# Patient Record
Sex: MALE | Race: ASIAN | NOT HISPANIC OR LATINO | URBAN - METROPOLITAN AREA
[De-identification: names, ages, dates, MRNs, and addresses within clinical notes are randomized per-mention and may not be internally consistent; named-entity substitution may affect disease eponyms.]

---

## 2021-06-21 PROBLEM — Z00.00 ENCOUNTER FOR PREVENTIVE HEALTH EXAMINATION: Status: ACTIVE | Noted: 2021-06-21

## 2021-06-23 ENCOUNTER — OUTPATIENT (OUTPATIENT)
Dept: OUTPATIENT SERVICES | Facility: HOSPITAL | Age: 61
LOS: 1 days | End: 2021-06-23

## 2021-06-23 VITALS
TEMPERATURE: 98 F | HEIGHT: 71 IN | OXYGEN SATURATION: 98 % | HEART RATE: 74 BPM | RESPIRATION RATE: 16 BRPM | SYSTOLIC BLOOD PRESSURE: 110 MMHG | WEIGHT: 166.01 LBS | DIASTOLIC BLOOD PRESSURE: 80 MMHG

## 2021-06-23 DIAGNOSIS — D17.1 BENIGN LIPOMATOUS NEOPLASM OF SKIN AND SUBCUTANEOUS TISSUE OF TRUNK: ICD-10-CM

## 2021-06-23 DIAGNOSIS — Z78.9 OTHER SPECIFIED HEALTH STATUS: ICD-10-CM

## 2021-06-23 LAB
HCT VFR BLD CALC: 45.1 % — SIGNIFICANT CHANGE UP (ref 39–50)
HGB BLD-MCNC: 14.8 G/DL — SIGNIFICANT CHANGE UP (ref 13–17)
MCHC RBC-ENTMCNC: 28.6 PG — SIGNIFICANT CHANGE UP (ref 27–34)
MCHC RBC-ENTMCNC: 32.8 GM/DL — SIGNIFICANT CHANGE UP (ref 32–36)
MCV RBC AUTO: 87.2 FL — SIGNIFICANT CHANGE UP (ref 80–100)
NRBC # BLD: 0 /100 WBCS — SIGNIFICANT CHANGE UP
NRBC # FLD: 0 K/UL — SIGNIFICANT CHANGE UP
PLATELET # BLD AUTO: 213 K/UL — SIGNIFICANT CHANGE UP (ref 150–400)
RBC # BLD: 5.17 M/UL — SIGNIFICANT CHANGE UP (ref 4.2–5.8)
RBC # FLD: 13.7 % — SIGNIFICANT CHANGE UP (ref 10.3–14.5)
WBC # BLD: 7.66 K/UL — SIGNIFICANT CHANGE UP (ref 3.8–10.5)
WBC # FLD AUTO: 7.66 K/UL — SIGNIFICANT CHANGE UP (ref 3.8–10.5)

## 2021-06-23 NOTE — H&P PST ADULT - HISTORY OF PRESENT ILLNESS
60 yr old male with preop dx of Benign lipoma left neck presents to have PST eval for Excision lipoma left neck. Patient states he noticed the mass on his left neck about a year and a half ago and was seen by PCP and then referred to Dr Christianson for further evaluation and treatment.

## 2021-06-23 NOTE — H&P PST ADULT - NSICDXPROBLEM_GEN_ALL_CORE_FT
PROBLEM DIAGNOSES  Problem: Benign lipomatous neoplasm of skin and subcutaneous tissue of trunk  Assessment and Plan: Scheduled for Excision lipoma left neck on 6/30/21  Preop instructions provided and patient verbalizes understanding.  Labs done and results pending.   Famotidine provided with instructions. Hibiclens provided with instructions and was signed by patient. Teach-back method was utilized to assess patient's understanding. Patient verbalized understanding.      Problem: Language barrier  Assessment and Plan: Patient speaks Kayla & Madan

## 2021-06-23 NOTE — H&P PST ADULT - NECK DETAILS
Left neck mass, Preop dx Benign lipmatous neoplasm of skin and subcutaneous tissue of trunk/supple Left neck palpable mass, non- tender, soft .Preop dx Benign lipmatous neoplasm of skin and subcutaneous tissue of trunk/supple

## 2021-06-23 NOTE — H&P PST ADULT - HEALTH CARE MAINTENANCE
COVID Vaccine  Pfizer 1st Dose 4/29/21  Pfizer 2nd dose 05/20/21 COVID Vaccine  Pfizer 1st Dose 4/29/21  Pfizer 2nd dose 05/20/21  Copy in chart

## 2021-06-23 NOTE — H&P PST ADULT - NSANTHOSAYNRD_GEN_A_CORE
No. ANDERS screening performed.  STOP BANG Legend: 0-2 = LOW Risk; 3-4 = INTERMEDIATE Risk; 5-8 = HIGH Risk

## 2021-06-25 ENCOUNTER — OUTPATIENT (OUTPATIENT)
Dept: OUTPATIENT SERVICES | Facility: HOSPITAL | Age: 61
LOS: 1 days | End: 2021-06-25
Payer: COMMERCIAL

## 2021-06-25 ENCOUNTER — APPOINTMENT (OUTPATIENT)
Dept: ULTRASOUND IMAGING | Facility: IMAGING CENTER | Age: 61
End: 2021-06-25
Payer: COMMERCIAL

## 2021-06-25 DIAGNOSIS — Z00.8 ENCOUNTER FOR OTHER GENERAL EXAMINATION: ICD-10-CM

## 2021-06-25 PROCEDURE — 76536 US EXAM OF HEAD AND NECK: CPT | Mod: 26

## 2021-06-25 PROCEDURE — 76536 US EXAM OF HEAD AND NECK: CPT

## 2021-06-29 ENCOUNTER — TRANSCRIPTION ENCOUNTER (OUTPATIENT)
Age: 61
End: 2021-06-29

## 2021-06-29 VITALS
TEMPERATURE: 98 F | DIASTOLIC BLOOD PRESSURE: 85 MMHG | WEIGHT: 166.01 LBS | OXYGEN SATURATION: 99 % | HEART RATE: 82 BPM | SYSTOLIC BLOOD PRESSURE: 118 MMHG | HEIGHT: 71 IN | RESPIRATION RATE: 18 BRPM

## 2021-06-30 ENCOUNTER — RESULT REVIEW (OUTPATIENT)
Age: 61
End: 2021-06-30

## 2021-06-30 ENCOUNTER — OUTPATIENT (OUTPATIENT)
Dept: OUTPATIENT SERVICES | Facility: HOSPITAL | Age: 61
LOS: 1 days | Discharge: HOME | End: 2021-06-30
Payer: COMMERCIAL

## 2021-06-30 VITALS
HEART RATE: 65 BPM | SYSTOLIC BLOOD PRESSURE: 119 MMHG | TEMPERATURE: 98 F | RESPIRATION RATE: 13 BRPM | DIASTOLIC BLOOD PRESSURE: 87 MMHG | OXYGEN SATURATION: 100 %

## 2021-06-30 DIAGNOSIS — D17.1 BENIGN LIPOMATOUS NEOPLASM OF SKIN AND SUBCUTANEOUS TISSUE OF TRUNK: ICD-10-CM

## 2021-06-30 PROCEDURE — 88342 IMHCHEM/IMCYTCHM 1ST ANTB: CPT | Mod: 26

## 2021-06-30 PROCEDURE — 88304 TISSUE EXAM BY PATHOLOGIST: CPT | Mod: 26

## 2021-06-30 RX ORDER — OXYCODONE AND ACETAMINOPHEN 5; 325 MG/1; MG/1
1 TABLET ORAL
Qty: 5 | Refills: 0
Start: 2021-06-30

## 2021-06-30 NOTE — ASU DISCHARGE PLAN (ADULT/PEDIATRIC) - ASU DC SPECIAL INSTRUCTIONSFT
WOUND CARE:  Please keep incisions clean and dry. Please do not Scrub or rub incisions. Do not use lotion or powder on incisions.   BATHING: You may shower and/or sponge bathe. You may use warm soapy water in the shower and rinse, pat dry.  DIET: Return to your usual diet.  NOTIFY YOUR SURGEON IF YOU HAVE: any bleeding that does not stop, any pus draining from your wound(s), any fever (over 100.4 F) persistent nausea/vomiting, or if your pain is not controlled on your discharge pain medications, unable to urinate.  Please follow up with your surgeon, Dr. Christianson    Please use an ice pack over the incision area as needed.

## 2021-06-30 NOTE — ASU DISCHARGE PLAN (ADULT/PEDIATRIC) - CARE PROVIDER_API CALL
Larry Christianson  SURGERY  [address] [place] [address]  Phone: [phone]  Fax: [phone]  Follow Up Time: 1 week

## 2021-07-13 LAB — SURGICAL PATHOLOGY STUDY: SIGNIFICANT CHANGE UP

## 2021-10-11 NOTE — H&P PST ADULT - NEGATIVE CARDIOVASCULAR SYMPTOMS
RLQ pain since Saturday. Ibuprofen at 1130. C/o nausea.   no chest pain/no palpitations/no dyspnea on exertion

## 2023-05-20 NOTE — H&P PST ADULT - NSANTHOBSERVEDRD_ENT_A_CORE
Past Medical History:   Diagnosis Date    Altered mental state 12/13/2022    Constipation     Gastroesophageal reflux disease with esophagitis without hemorrhage 9/26/2021    GERD (gastroesophageal reflux disease)     Hematemesis with nausea 9/26/2021    Hyperlipidemia     Memory loss     Schizophrenia     Seizures     Ulcer of esophagus without bleeding 9/26/2021       Past Surgical History:   Procedure Laterality Date    EXTRACTION OF TOOTH N/A 4/25/2022    Procedure: EXTRACTION, TOOTH;  Surgeon: Rob Farris DMD, MD;  Location: [facility];  Service: Oral Surgery;  Laterality: N/A;       Review of patient's allergies indicates:   Allergen Reactions    Ativan [lorazepam]     Prolixin [fluphenazine hcl]        No current facility-administered medications on file prior to encounter.     Current Outpatient Medications on File Prior to Encounter   Medication Sig    acetaminophen (TYLENOL) 500 MG tablet Take 500 mg by mouth every 6 (six) hours as needed for Pain.    bisacodyL (DULCOLAX) 10 mg Supp Place 10 mg rectally Every 3 (three) days.    cyproheptadine (PERIACTIN) 4 mg tablet Take 4 mg by mouth 3 (three) times daily.    docusate sodium (COLACE) 100 MG capsule Take 100 mg by mouth 2 (two) times daily.    folic acid (FOLVITE) 1 MG tablet Take 1 mg by mouth once daily.    INVEGA SUSTENNA 117 mg/0.75 mL Syrg injection Inject 117 mg into the muscle every 30 days.    levETIRAcetam (KEPPRA) 1000 MG tablet Take 1,000 mg by mouth 2 (two) times daily.    multivitamin with minerals tablet Take 1 tablet by mouth once daily.    omeprazole (PRILOSEC) 40 MG capsule Take 1 capsule (40 mg total) by mouth 2 (two) times daily before meals.    ondansetron (ZOFRAN) 4 MG tablet Take 4 mg by mouth every 8 (eight) hours as needed for Nausea.    lactulose 10 gram/15 ml (CHRONULAC) 10 gram/15 mL (15 mL) solution Take 60 mLs by mouth 4 (four) times daily. 60 cc po qid     Family History       Family history is unknown by patient.           Tobacco Use    Smoking status: Never    Smokeless tobacco: Never   Substance and Sexual Activity    Alcohol use: Not Currently    Drug use: Never    Sexual activity: Not on file     Review of Systems   Unable to perform ROS: Dementia   Objective:     Vital Signs (Most Recent):  Temp: 98.8 °F (37.1 °C) (05/20/23 0702)  Pulse: 90 (05/20/23 1312)  Resp: 17 (05/20/23 0702)  BP: 138/78 (05/20/23 1312)  SpO2: 97 % (05/20/23 1312) Vital Signs (24h Range):  Temp:  [98.8 °F (37.1 °C)] 98.8 °F (37.1 °C)  Pulse:  [] 90  Resp:  [17] 17  SpO2:  [93 %-100 %] 97 %  BP: (108-138)/(66-80) 138/78     Weight: 54.4 kg (120 lb)  Body mass index is 18.79 kg/m².     Physical Exam  Vitals and nursing note reviewed.   Constitutional:       General: She is not in acute distress.     Appearance: She is not ill-appearing, toxic-appearing or diaphoretic.   HENT:      Head: Normocephalic and atraumatic.      Right Ear: External ear normal.      Left Ear: External ear normal.      Nose: Nose normal.   Eyes:      Conjunctiva/sclera: Conjunctivae normal.   Neck:      Vascular: No carotid bruit.   Cardiovascular:      Rate and Rhythm: Normal rate and regular rhythm.      Pulses: Normal pulses.      Heart sounds: Normal heart sounds. No murmur heard.  Pulmonary:      Effort: Pulmonary effort is normal. No respiratory distress.      Breath sounds: Normal breath sounds. No wheezing, rhonchi or rales.   Abdominal:      General: Abdomen is flat. Bowel sounds are normal.      Palpations: Abdomen is soft.      Tenderness: There is no abdominal tenderness.   Musculoskeletal:      Right lower leg: No edema.      Left lower leg: No edema.   Skin:     Capillary Refill: Capillary refill takes less than 2 seconds.      Findings: No lesion or rash.   Neurological:      Mental Status: She is alert.      Comments: Unable to assess 2/2 advanced dementia   Psychiatric:      Comments: Unable to assess 2/2 advanced dementia                Significant Labs:  All pertinent labs within the past 24 hours have been reviewed.    Significant Imaging: I have reviewed all pertinent imaging results/findings within the past 24 hours.   No

## 2023-10-07 ENCOUNTER — INPATIENT (INPATIENT)
Facility: HOSPITAL | Age: 63
LOS: 2 days | Discharge: HOME | End: 2023-10-10
Attending: THORACIC SURGERY (CARDIOTHORACIC VASCULAR SURGERY) | Admitting: THORACIC SURGERY (CARDIOTHORACIC VASCULAR SURGERY)
Payer: COMMERCIAL

## 2023-10-07 VITALS
SYSTOLIC BLOOD PRESSURE: 131 MMHG | RESPIRATION RATE: 18 BRPM | OXYGEN SATURATION: 100 % | HEART RATE: 101 BPM | TEMPERATURE: 98 F | DIASTOLIC BLOOD PRESSURE: 75 MMHG

## 2023-10-07 PROCEDURE — 99285 EMERGENCY DEPT VISIT HI MDM: CPT

## 2023-10-07 NOTE — ED ADULT TRIAGE NOTE - CHIEF COMPLAINT QUOTE
Pt ambulatory to triage. Kayla speaking As per Son...." He slip while carrying packages he slip on last step and fell onto his back...he c/o left sided mid back pain...seen at Urgent prescribed pain med and muscle relaxor it is not helping. They recommended back and spine films."  Denies head trauma, no Loc.  Denies difficulty breathing.

## 2023-10-08 DIAGNOSIS — J93.9 PNEUMOTHORAX, UNSPECIFIED: ICD-10-CM

## 2023-10-08 PROCEDURE — 72074 X-RAY EXAM THORAC SPINE4/>VW: CPT | Mod: 26

## 2023-10-08 PROCEDURE — 71046 X-RAY EXAM CHEST 2 VIEWS: CPT | Mod: 26

## 2023-10-08 PROCEDURE — 71100 X-RAY EXAM RIBS UNI 2 VIEWS: CPT | Mod: 26,LT

## 2023-10-08 PROCEDURE — 99222 1ST HOSP IP/OBS MODERATE 55: CPT

## 2023-10-08 PROCEDURE — 71045 X-RAY EXAM CHEST 1 VIEW: CPT | Mod: 26,59

## 2023-10-08 RX ORDER — PANTOPRAZOLE SODIUM 20 MG/1
40 TABLET, DELAYED RELEASE ORAL
Refills: 0 | Status: ACTIVE | OUTPATIENT
Start: 2023-10-08 | End: 2024-09-05

## 2023-10-08 RX ORDER — IBUPROFEN 200 MG
400 TABLET ORAL ONCE
Refills: 0 | Status: COMPLETED | OUTPATIENT
Start: 2023-10-08 | End: 2023-10-08

## 2023-10-08 RX ORDER — HYDROMORPHONE HYDROCHLORIDE 2 MG/ML
0.5 INJECTION INTRAMUSCULAR; INTRAVENOUS; SUBCUTANEOUS ONCE
Refills: 0 | Status: DISCONTINUED | OUTPATIENT
Start: 2023-10-08 | End: 2023-10-08

## 2023-10-08 RX ORDER — POLYETHYLENE GLYCOL 3350 17 G/17G
17 POWDER, FOR SOLUTION ORAL DAILY
Refills: 0 | Status: ACTIVE | OUTPATIENT
Start: 2023-10-08 | End: 2024-09-05

## 2023-10-08 RX ORDER — ATORVASTATIN CALCIUM 80 MG/1
1 TABLET, FILM COATED ORAL
Qty: 0 | Refills: 0 | DISCHARGE

## 2023-10-08 RX ORDER — OXYCODONE HYDROCHLORIDE 5 MG/1
5 TABLET ORAL EVERY 4 HOURS
Refills: 0 | Status: ACTIVE | OUTPATIENT
Start: 2023-10-08 | End: 2023-10-15

## 2023-10-08 RX ORDER — ACETAMINOPHEN 500 MG
1000 TABLET ORAL EVERY 6 HOURS
Refills: 0 | Status: ACTIVE | OUTPATIENT
Start: 2023-10-08 | End: 2023-10-11

## 2023-10-08 RX ORDER — INFLUENZA VIRUS VACCINE 15; 15; 15; 15 UG/.5ML; UG/.5ML; UG/.5ML; UG/.5ML
0.5 SUSPENSION INTRAMUSCULAR ONCE
Refills: 0 | Status: ACTIVE | OUTPATIENT
Start: 2023-10-08 | End: 2024-09-05

## 2023-10-08 RX ORDER — OXYCODONE HYDROCHLORIDE 5 MG/1
10 TABLET ORAL EVERY 4 HOURS
Refills: 0 | Status: ACTIVE | OUTPATIENT
Start: 2023-10-08 | End: 2023-10-15

## 2023-10-08 RX ORDER — HEPARIN SODIUM 5000 [USP'U]/ML
5000 INJECTION INTRAVENOUS; SUBCUTANEOUS EVERY 12 HOURS
Refills: 0 | Status: ACTIVE | OUTPATIENT
Start: 2023-10-08 | End: 2024-09-05

## 2023-10-08 RX ORDER — ACETAMINOPHEN 500 MG
650 TABLET ORAL ONCE
Refills: 0 | Status: COMPLETED | OUTPATIENT
Start: 2023-10-08 | End: 2023-10-08

## 2023-10-08 RX ADMIN — HYDROMORPHONE HYDROCHLORIDE 0.5 MILLIGRAM(S): 2 INJECTION INTRAMUSCULAR; INTRAVENOUS; SUBCUTANEOUS at 13:04

## 2023-10-08 RX ADMIN — Medication 1000 MILLIGRAM(S): at 23:57

## 2023-10-08 RX ADMIN — Medication 1000 MILLIGRAM(S): at 18:53

## 2023-10-08 RX ADMIN — Medication 650 MILLIGRAM(S): at 04:21

## 2023-10-08 RX ADMIN — Medication 400 MILLIGRAM(S): at 04:21

## 2023-10-08 NOTE — ED ADULT NURSE NOTE - NSFALLUNIVINTERV_ED_ALL_ED
Bed/Stretcher in lowest position, wheels locked, appropriate side rails in place/Call bell, personal items and telephone in reach/Instruct patient to call for assistance before getting out of bed/chair/stretcher/Non-slip footwear applied when patient is off stretcher/[place] to call system/Physically safe environment - no spills, clutter or unnecessary equipment/Purposeful proactive rounding/Room/bathroom lighting operational, light cord in reach

## 2023-10-08 NOTE — ED ADULT NURSE REASSESSMENT NOTE - NS ED NURSE REASSESS COMMENT FT1
Report received from RODRICK Brennan. Pt transferred from intake to  8. CT surgery at bedside. Pt awake and alert, A&OX4, resp even and unlabored. Denies CP, SOB, HA, dizziness, palpitations, blurry vision. Resting comfortably. Report received from RODRICK Brennan. Pt transferred from intake to  8. CT surgery at bedside. Pt awake and alert, A&OX4, resp even and unlabored. Denies CP, SOB, HA, dizziness, palpitations, blurry vision. Resting comfortably. 20G IV placed to R AC.

## 2023-10-08 NOTE — ED PROVIDER NOTE - OBJECTIVE STATEMENT
Dr Abreu  62-year-old male denies any past medical history presents with left-sided upper back chest pain since this morning at 1130 status post fall.  Patient was carrying a box slipped in the rain fell backwards onto 2 steps.  Denies hitting his head no LOC denies feeling dizzy or lightheaded before or after the event.  Was able to get up and fell.  In the afternoon went to urgent care and was prescribed pain meds and referred for x-rays however x-ray place was closed.  Was clinical tomorrow however when he went to the exam he had pain prompted ER visit.  Pain is worse with movement and laying down.  Denies feeling short of breath no dyspnea on exertion.  No nausea no vomiting no abdominal pain no fever no chills.  Not on anticoagulation.  States he took a muscle relaxant and naproxen prior to ER visit and at this time does not want any more pain meds.

## 2023-10-08 NOTE — PATIENT PROFILE ADULT - FALL HARM RISK - UNIVERSAL INTERVENTIONS
Bed in lowest position, wheels locked, appropriate side rails in place/Call bell, personal items and telephone in reach/Instruct patient to call for assistance before getting out of bed or chair/Non-slip footwear when patient is out of bed/[place] to call system/Physically safe environment - no spills, clutter or unnecessary equipment/Purposeful Proactive Rounding/Room/bathroom lighting operational, light cord in reach

## 2023-10-08 NOTE — H&P ADULT - NSHPLABSRESULTS_GEN_ALL_CORE
CC: [id number] EXAM:  XR CHEST PA LAT 2V   ORDERED BY: YOUNG DOTY     PROCEDURE DATE:  10/08/2023          INTERPRETATION:  EXAMINATION: XR CHEST PA AND LATERAL    CLINICAL INDICATION: Reevaluation of pneumothorax.    TECHNIQUE: Single frontal, portable view of the chest was obtained.    COMPARISON: Chest x-ray 10/8/2023.    FINDINGS:    The heart is normal in size.  The lungs are clear.  Small left apical pneumothorax, slightly increased in size from prior   exam. Small left pleural effusion.  Minimally displaced fracture of the left posterior fourth rib.    IMPRESSION:  Slight increase in size of small left apical pneumothorax.  Redemonstrated minimally displaced acute fracture of the left posterior   fourth rib.    --- End of Report ---

## 2023-10-08 NOTE — H&P ADULT - NSHPPHYSICALEXAM_GEN_ALL_CORE
General: WD NAD  Neurology: A&Ox3, nonfocal, CALDWELL x 4  Eyes: PERRLA/ EOMI, Gross vision intact  ENT/Neck: Neck supple, trachea midline, No JVD, Gross hearing intact  Respiratory: decreased BS to left UL  CV: RRR, S1S2, no murmurs, rubs or gallops  Abdominal: Soft, NT, ND +BS,   Extremities: No edema, + peripheral pulses  Skin: No Rashes, Hematoma, Ecchymosis

## 2023-10-08 NOTE — ED ADULT NURSE NOTE - OBJECTIVE STATEMENT
Pt received to intake area complaining of L sided mid back pain after falling down the stairs while carrying packages and landing on his back. Pt denies hitting his head, no LOC.

## 2023-10-08 NOTE — ED PROVIDER NOTE - PHYSICAL EXAMINATION
Dr Abreu  Nontoxic male ANO x3 with son at bedside.  Has no sign of head or facial trauma.  No abrasions lacerations.  Nontender midline cervical, thoracic, lumbar spine.  No step-offs.  No ecchymosis or bruising appreciated of the chest back or abdomen.  No chest wall tenderness no crepitus.  Good air entry bilaterally clear lungs.  Soft nondistended and abdomen nontender on exam.  No CVA tenderness.  Moving all extremities with no focal deficits. Dr Abreu  Nontoxic male ANO x3 with son at bedside.  Has no sign of head or facial trauma.  No abrasions lacerations.  Nontender midline cervical, thoracic, lumbar spine.  No step-offs.  No ecchymosis or bruising appreciated of the chest back or abdomen.  No chest wall tenderness no crepitus.  Good air entry bilaterally clear lungs. Answering in full sentences. uncomfortable when laying down.  Soft nondistended and abdomen nontender on exam.  No CVA tenderness.  Moving all extremities with no focal deficits.

## 2023-10-08 NOTE — ED ADULT NURSE REASSESSMENT NOTE - NS ED NURSE REASSESS COMMENT FT1
Break coverage RN: Pt A&Ox4 resting on stretcher. Respirations even and unlabored, sating 100% on room air, sinus tachycardia on monitor, speaking in full sentences without any difficulty. Chest tube intact and connected to suction. IV site patent - no redness or swelling noted. pt offers no complaints at this time, well appearing. pt family member at bedside. Currently awaiting bed assignment. bed in lowest position, side rails up, call bell in hand, safety maintained.

## 2023-10-08 NOTE — ED PROVIDER NOTE - CLINICAL SUMMARY MEDICAL DECISION MAKING FREE TEXT BOX
Plan EKG, cxr, xray left rib , pt requesting thoracic xray nontender on exam, states  recommended xray.   offered and declined pain med at this time   EKG SR 93 no REX

## 2023-10-08 NOTE — H&P ADULT - PROBLEM SELECTOR PLAN 1
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  Repeat C-reactive protein to document downward trending levels.  No active disease to suspect any underlying systemic lupus activity at this time.   Case reviewed, pt seen with Dr. Mitchell  Will admit to Thoracic surgery, [address] [place]  Will place bedside left sided Pigtail cath for ptx.   Daily CXR  Pain control  Incentive spirometer use  Above d/w ER team and pt/pt's family who are in agreement.

## 2023-10-08 NOTE — H&P ADULT - HISTORY OF PRESENT ILLNESS
On Lexapro  Supportive counseling  3/8/2020 on today's examination mood and affect are stable.  Patient is cooperative interactive and participatory.  No emotional lability on my exam    3/9/2022 mood and affect are stable my exam    3/10/2022 patient remains stable my exam.  No agitation, impulsivity or combativeness.  No emotional ability.  Noted patient remains on Lexapro daily.  Patient does not recall , component of his decreased memory, insight.    3/11/2022 interactive and cooperative.  No emotional lability.  Agitation, combativeness not appreciated during my exam   61yo M with no PMH no PSH was carrying boxes in the rain yesterday, slipped, fell backwards and hit his back on some steps. Developed worsening pain and SOB, came to [facility] ER for assessment. Upon evaluation found to have minimally displaced fracture of posterior 4th rib and left apical ptx. Pt was given NRB and observed and repeat CXR done this am showed slight increase in left apical ptx. Thoracic surgery consulted for enlarging ptx. Pt seen and examined in ER. Pt's son at bedside. C.o pain to left back at area of injury. Denies any injury or pain anywhere else. Denies any HA, LOC, chest pain, abd pain, n/v/d or LE edema. Wearing NRB per ER team to help minimize ptx overnight.   Pt appears to be in no distress. Pt denies any medical history. Also denies any regular medication use at home.

## 2023-10-08 NOTE — ED PROVIDER NOTE - PROGRESS NOTE DETAILS
Patient signed out to me by Dr. Mckeon.  Repeat chest x-ray shows no increased size of an apical pneumo.  Cardiothoracic surgery was consulted and will be admitted to their service.  They are planning on putting in a chest tube.

## 2023-10-09 LAB
HCV AB S/CO SERPL IA: 0.39 S/CO — SIGNIFICANT CHANGE UP (ref 0–0.99)
HCV AB SERPL-IMP: SIGNIFICANT CHANGE UP

## 2023-10-09 PROCEDURE — 99232 SBSQ HOSP IP/OBS MODERATE 35: CPT

## 2023-10-09 PROCEDURE — 71045 X-RAY EXAM CHEST 1 VIEW: CPT | Mod: 26,76

## 2023-10-09 RX ADMIN — PANTOPRAZOLE SODIUM 40 MILLIGRAM(S): 20 TABLET, DELAYED RELEASE ORAL at 06:06

## 2023-10-09 RX ADMIN — Medication 1000 MILLIGRAM(S): at 23:20

## 2023-10-09 RX ADMIN — POLYETHYLENE GLYCOL 3350 17 GRAM(S): 17 POWDER, FOR SOLUTION ORAL at 12:36

## 2023-10-09 RX ADMIN — Medication 1000 MILLIGRAM(S): at 06:05

## 2023-10-09 RX ADMIN — Medication 1000 MILLIGRAM(S): at 17:23

## 2023-10-09 RX ADMIN — Medication 1000 MILLIGRAM(S): at 12:36

## 2023-10-09 RX ADMIN — Medication 1000 MILLIGRAM(S): at 06:57

## 2023-10-09 RX ADMIN — Medication 1000 MILLIGRAM(S): at 17:53

## 2023-10-09 RX ADMIN — Medication 1000 MILLIGRAM(S): at 00:34

## 2023-10-09 RX ADMIN — HEPARIN SODIUM 5000 UNIT(S): 5000 INJECTION INTRAVENOUS; SUBCUTANEOUS at 17:23

## 2023-10-09 RX ADMIN — Medication 1000 MILLIGRAM(S): at 13:06

## 2023-10-09 RX ADMIN — HEPARIN SODIUM 5000 UNIT(S): 5000 INJECTION INTRAVENOUS; SUBCUTANEOUS at 06:07

## 2023-10-09 NOTE — PROGRESS NOTE ADULT - SUBJECTIVE AND OBJECTIVE BOX
Subjective: patient seen and examined with thoracic surgery team and Dr Mitchell on rounds  pt without acute complaints  pain controlled  pt denies chest pain or shortness of breath  taught how to use incentive spirometer  on bowel regimen, +flatus, +BM  ambulatory with assistance  pt son at bedside  questions and concerns addressed by Dr Mitchell  d/w attending on morning TEAMS report      Vital Signs:  Vital Signs Last 24 Hrs  T(C): 36.8 (10-09-23 @ 08:42), Max: 36.8 (10-08-23 @ 12:50)  T(F): 98.3 (10-09-23 @ 08:42), Max: 98.3 (10-09-23 @ 04:30)  HR: 96 (10-09-23 @ 08:42) (90 - 110)  BP: 124/66 (10-09-23 @ 08:42) (108/75 - 152/83)  RR: 18 (10-09-23 @ 08:42) (16 - 18)  SpO2: 98% (10-09-23 @ 08:42) (98% - 100%) on (O2)    PE  Telemetry/Alarms: SR  General: awake and alert NAD  Neurology: A&Ox3, nonfocal, CALDWELL x 4  Respiratory: CTA B/L  CV: RRR, S1S2, no murmurs, rubs or gallops  Abdominal: Soft, NT, ND +BS, Last BM  Extremities: No edema, + peripheral pulses  Incisions: c,d,i  Tubes: L chest tube was on suction this AM with minimal output  placed to waterseal during rounds w Dr Mitchell  chest tube with no air leak  Relevant labs, radiology and Medications reviewed            MEDICATIONS  (STANDING):  acetaminophen     Tablet .. 1000 milliGRAM(s) Oral every 6 hours  heparin   Injectable 5000 Unit(s) SubCutaneous every 12 hours  influenza   Vaccine 0.5 milliLiter(s) IntraMuscular once  pantoprazole    Tablet 40 milliGRAM(s) Oral before breakfast  polyethylene glycol 3350 17 Gram(s) Oral daily    MEDICATIONS  (PRN):  oxyCODONE    IR 10 milliGRAM(s) Oral every 4 hours PRN Severe Pain (7 - 10)  oxyCODONE    IR 5 milliGRAM(s) Oral every 4 hours PRN Moderate Pain (4 - 6)    Pertinent Physical Exam  I&O's Summary    08 Oct 2023 07:01  -  09 Oct 2023 07:00  --------------------------------------------------------  IN: 475 mL / OUT: 175 mL / NET: 300 mL         Social History:  · Substance use	No  · Social History (marital status, living situation, occupation, and sexual history)	, lives w wife and sons  Works as   Denies smoking, ETOH or drug use.     Tobacco Screening:  · Core Measure Site	No  · Has the patient used tobacco in the past 30 days?	No        Assessment  63yo M s/p mechanical fall now with left posterior 4th rib fracture. L PTC placed for PTX that has now resolved. Tube was on suction until this morning and then placed to waterseal today.  Will follow up CXR w tube on waterseal later today.         PLAN  Neuro: Pain management  Pulm: Encourage coughing, deep breathing and use of incentive spirometry. Daily CXR.   Cardio: Monitor telemetry/alarms  GI: Tolerating diet. Continue stool softeners.  Renal: monitor urine output, supplement electrolytes as needed  Vasc: Heparin SC/SCDs for DVT prophylaxis  Heme: Stable H/H. .   ID: Off antibiotics. Stable.  Therapy: OOB/ambulate  Tubes: Monitor Chest tube output, air leak status and follow up waterseal CXR   Disposition: ayesha PISANO PTC tomorrow and possible DC home tomorrow  Discussed with Cardiothoracic Team at AM rounds.

## 2023-10-10 ENCOUNTER — NON-APPOINTMENT (OUTPATIENT)
Age: 63
End: 2023-10-10

## 2023-10-10 ENCOUNTER — TRANSCRIPTION ENCOUNTER (OUTPATIENT)
Age: 63
End: 2023-10-10

## 2023-10-10 VITALS
TEMPERATURE: 98 F | HEART RATE: 95 BPM | RESPIRATION RATE: 18 BRPM | HEIGHT: 70 IN | WEIGHT: 155.21 LBS | SYSTOLIC BLOOD PRESSURE: 120 MMHG | OXYGEN SATURATION: 100 % | DIASTOLIC BLOOD PRESSURE: 68 MMHG

## 2023-10-10 PROCEDURE — 99238 HOSP IP/OBS DSCHRG MGMT 30/<: CPT

## 2023-10-10 PROCEDURE — 71045 X-RAY EXAM CHEST 1 VIEW: CPT | Mod: 26,76

## 2023-10-10 RX ORDER — ACETAMINOPHEN 500 MG
2 TABLET ORAL
Qty: 0 | Refills: 0 | DISCHARGE
Start: 2023-10-10

## 2023-10-10 RX ORDER — OXYCODONE HYDROCHLORIDE 5 MG/1
2 TABLET ORAL
Qty: 40 | Refills: 0
Start: 2023-10-10 | End: 2023-10-14

## 2023-10-10 RX ORDER — POLYETHYLENE GLYCOL 3350 17 G/17G
17 POWDER, FOR SOLUTION ORAL
Qty: 0 | Refills: 0 | DISCHARGE
Start: 2023-10-10

## 2023-10-10 RX ADMIN — PANTOPRAZOLE SODIUM 40 MILLIGRAM(S): 20 TABLET, DELAYED RELEASE ORAL at 05:35

## 2023-10-10 RX ADMIN — Medication 1000 MILLIGRAM(S): at 00:00

## 2023-10-10 RX ADMIN — Medication 1000 MILLIGRAM(S): at 05:35

## 2023-10-10 RX ADMIN — HEPARIN SODIUM 5000 UNIT(S): 5000 INJECTION INTRAVENOUS; SUBCUTANEOUS at 05:35

## 2023-10-10 NOTE — DISCHARGE NOTE NURSING/CASE MANAGEMENT/SOCIAL WORK - NSDCFUADDAPPT_GEN_ALL_CORE_FT
See Dr. Mitchell in about 2 weeks. Call to make an apt. [phone]  Have a Chest xray done prior to that apt.   See you PCP as needed.

## 2023-10-10 NOTE — DISCHARGE NOTE PROVIDER - NSDCFUADDAPPT_GEN_ALL_CORE_FT
See Dr. Mitchell in about 2 weeks. Call to make an apt. [phone]  Have a Chest xray done prior to that apt.   See you PCP as needed.

## 2023-10-10 NOTE — DISCHARGE NOTE PROVIDER - NSDCFUADDINST_GEN_ALL_CORE_FT
Please walk 4-5 x per day, Increase as tolerated. You may climb stairs. Continue to use incentive spirometer.   You may keep all wounds uncovered. Please shower daily.  See Dr. Mitchell's office in 2 weeks. Please call [phone] for an apt. Please get an CXR the day before your appointment and bring the CD with you to your follow up appointment.  Take pain pills only as needed. Please take a laxative to help support your bowel movements while on pain medication. Laxatives can be available over the counter at your local pharmacy.  Please call the office at [phone] if you have fever's chills, worsening shortness of breath, chest pain, warmth, redness or purulent discharge from the insertion site.  Please walk 4-5 x per day, Increase as tolerated. You may climb stairs. Continue to use incentive spirometer.   In 48hr you may remove dressing and keep all wounds uncovered. Please shower daily.  See Dr. Mitchell's office in 2 weeks. Please call [phone] for an apt. Please get an chest xray the day before your appointment and bring the CD with you to your follow up appointment.  Take pain pills only as needed. Please take a laxative to help support your bowel movements while on pain medication. Laxatives can be available over the counter at your local pharmacy.  Please call the office at [phone] if you have fever's chills, worsening shortness of breath, chest pain, warmth, redness or purulent discharge from the insertion site.

## 2023-10-10 NOTE — DISCHARGE NOTE PROVIDER - CARE PROVIDER_API CALL
Joshua Mitchell  Thoracic Surgery  [phone] [place] [address] [place] [address]  Phone: [phone]  Fax: [phone]  Follow Up Time:

## 2023-10-10 NOTE — DISCHARGE NOTE PROVIDER - HOSPITAL COURSE
Pt is a 62M no PMH came into ED after slipping and falling on stairs, found to have mildly displaced acute posterior Left rib fx and sml apical ptx. Pt had PTC placed in the ED. On 10/9/23, PTC was placed to WS. On 10/10/23, PTC was removed and CXR remained stable. Pt clearead for DC Pt is a 62M no PMH came into ED after slipping and falling on stairs, found to have mildly displaced acute posterior Left rib fx and sml apical ptx. Pt had PTC placed in the ED. On 10/9/23, PTC was placed to WS. On 10/10/23, PTC was removed and CXR remained stable. Pt CXR after PTC removed showed no ptx.  Pt feels well. OOB and amb ad farhana. ON RA. Pain at rib fracture, controlled w pain meds. No CP or SOB. Cleared for dc to home by Dr. Mitchell.   Vital Signs Last 24 Hrs  T(C): 36.8 (10 Oct 2023 07:00), Max: 37.2 (09 Oct 2023 12:22)  T(F): 98.2 (10 Oct 2023 07:00), Max: 98.9 (09 Oct 2023 12:22)  HR: 95 (10 Oct 2023 07:00) (88 - 95)  BP: 120/68 (10 Oct 2023 07:00) (114/69 - 128/69)  BP(mean): --  RR: 18 (10 Oct 2023 07:00) (18 - 18)  SpO2: 100% (10 Oct 2023 07:00) (95% - 100%)    Parameters below as of 10 Oct 2023 07:00  Patient On (Oxygen Delivery Method): room air

## 2023-10-10 NOTE — DISCHARGE NOTE NURSING/CASE MANAGEMENT/SOCIAL WORK - PATIENT PORTAL LINK FT
You can access the FollowMyHealth Patient Portal offered by [facility] by registering at the following website: [email or web address]. By joining [facility]’s FollowMyHealth portal, you will also be able to view your health information using other applications (apps) compatible with our system.

## 2023-10-10 NOTE — DISCHARGE NOTE PROVIDER - NSDCMRMEDTOKEN_GEN_ALL_CORE_FT
acetaminophen 500 mg oral tablet: 2 tab(s) orally every 6 hours as needed for  mild pain  oxyCODONE 5 mg oral tablet: 2 tab(s) orally every 6 hours as needed for  severe pain Can take 1 tab for moderate pain. MDD: 8  polyethylene glycol 3350 oral powder for reconstitution: 17 gram(s) orally once a day

## 2023-10-20 PROBLEM — J93.9 PNEUMOTHORAX: Status: ACTIVE | Noted: 2023-10-20

## 2023-10-25 ENCOUNTER — OUTPATIENT (OUTPATIENT)
Dept: OUTPATIENT SERVICES | Facility: HOSPITAL | Age: 63
LOS: 1 days | End: 2023-10-25
Payer: COMMERCIAL

## 2023-10-25 ENCOUNTER — APPOINTMENT (OUTPATIENT)
Dept: RADIOLOGY | Facility: IMAGING CENTER | Age: 63
End: 2023-10-25
Payer: COMMERCIAL

## 2023-10-25 ENCOUNTER — RESULT REVIEW (OUTPATIENT)
Age: 63
End: 2023-10-25

## 2023-10-25 DIAGNOSIS — Z00.8 ENCOUNTER FOR OTHER GENERAL EXAMINATION: ICD-10-CM

## 2023-10-25 PROCEDURE — 71046 X-RAY EXAM CHEST 2 VIEWS: CPT | Mod: 26

## 2023-10-25 PROCEDURE — 71046 X-RAY EXAM CHEST 2 VIEWS: CPT

## 2023-10-26 ENCOUNTER — APPOINTMENT (OUTPATIENT)
Dept: THORACIC SURGERY | Facility: CLINIC | Age: 63
End: 2023-10-26
Payer: COMMERCIAL

## 2023-10-26 VITALS
DIASTOLIC BLOOD PRESSURE: 74 MMHG | HEART RATE: 92 BPM | SYSTOLIC BLOOD PRESSURE: 111 MMHG | BODY MASS INDEX: 20.32 KG/M2 | WEIGHT: 150 LBS | HEIGHT: 72 IN | OXYGEN SATURATION: 98 %

## 2023-10-26 DIAGNOSIS — J93.9 PNEUMOTHORAX, UNSPECIFIED: ICD-10-CM

## 2023-10-26 PROCEDURE — 99213 OFFICE O/P EST LOW 20 MIN: CPT

## 2023-10-27 ENCOUNTER — NON-APPOINTMENT (OUTPATIENT)
Age: 63
End: 2023-10-27